# Patient Record
Sex: MALE | Race: WHITE | NOT HISPANIC OR LATINO | Employment: FULL TIME | URBAN - METROPOLITAN AREA
[De-identification: names, ages, dates, MRNs, and addresses within clinical notes are randomized per-mention and may not be internally consistent; named-entity substitution may affect disease eponyms.]

---

## 2018-04-07 ENCOUNTER — APPOINTMENT (OUTPATIENT)
Dept: RADIOLOGY | Facility: IMAGING CENTER | Age: 43
End: 2018-04-07
Attending: NURSE PRACTITIONER
Payer: COMMERCIAL

## 2018-04-07 ENCOUNTER — OFFICE VISIT (OUTPATIENT)
Dept: URGENT CARE | Facility: CLINIC | Age: 43
End: 2018-04-07
Payer: COMMERCIAL

## 2018-04-07 VITALS
SYSTOLIC BLOOD PRESSURE: 134 MMHG | OXYGEN SATURATION: 97 % | HEART RATE: 86 BPM | BODY MASS INDEX: 35 KG/M2 | TEMPERATURE: 97.3 F | WEIGHT: 250 LBS | RESPIRATION RATE: 18 BRPM | HEIGHT: 71 IN | DIASTOLIC BLOOD PRESSURE: 88 MMHG

## 2018-04-07 DIAGNOSIS — M54.41 ACUTE RIGHT-SIDED LOW BACK PAIN WITH RIGHT-SIDED SCIATICA: ICD-10-CM

## 2018-04-07 DIAGNOSIS — S39.012A STRAIN OF MUSCLE, FASCIA AND TENDON OF LOWER BACK, INITIAL ENCOUNTER: ICD-10-CM

## 2018-04-07 DIAGNOSIS — M51.36 DEGENERATIVE DISC DISEASE, LUMBAR: ICD-10-CM

## 2018-04-07 PROCEDURE — 72100 X-RAY EXAM L-S SPINE 2/3 VWS: CPT | Mod: TC,FY | Performed by: NURSE PRACTITIONER

## 2018-04-07 PROCEDURE — 99204 OFFICE O/P NEW MOD 45 MIN: CPT | Performed by: NURSE PRACTITIONER

## 2018-04-07 RX ORDER — CYCLOBENZAPRINE HCL 5 MG
5-10 TABLET ORAL 3 TIMES DAILY PRN
Qty: 30 TAB | Refills: 0 | Status: SHIPPED | OUTPATIENT
Start: 2018-04-07

## 2018-04-07 RX ORDER — PREDNISONE 10 MG/1
40 TABLET ORAL DAILY
Qty: 20 TAB | Refills: 0 | Status: SHIPPED | OUTPATIENT
Start: 2018-04-07 | End: 2018-04-12

## 2018-04-07 ASSESSMENT — ENCOUNTER SYMPTOMS
SORE THROAT: 0
VOMITING: 0
PERIANAL NUMBNESS: 0
SHORTNESS OF BREATH: 0
PARESTHESIAS: 0
PARESIS: 0
CHILLS: 0
FALLS: 0
EYE PAIN: 0
NECK PAIN: 0
FEVER: 0
BACK PAIN: 1
LEG PAIN: 1
NUMBNESS: 0
DIZZINESS: 0
MYALGIAS: 0
NAUSEA: 0
TINGLING: 0
BOWEL INCONTINENCE: 0

## 2018-04-07 ASSESSMENT — PAIN SCALES - GENERAL: PAINLEVEL: 10=SEVERE PAIN

## 2018-04-07 ASSESSMENT — PATIENT HEALTH QUESTIONNAIRE - PHQ9: CLINICAL INTERPRETATION OF PHQ2 SCORE: 0

## 2018-04-07 NOTE — PROGRESS NOTES
Subjective:     Alexandro Gonzalez is a 42 y.o. male who presents for Back Pain (1 week ago injured back doing yard work, monday was prescribed prednisone and now almost done but now going down leg, was not able to move to stand up, spirals down leg into toes. (R) )       Back Pain   This is a new problem. The current episode started in the past 7 days. The problem occurs constantly. The problem is unchanged. The pain is present in the lumbar spine. The quality of the pain is described as shooting. The pain radiates to the right knee and right thigh. The pain is at a severity of 10/10. The pain is severe. The pain is the same all the time. The symptoms are aggravated by bending, position, sitting and twisting. Associated symptoms include leg pain. Pertinent negatives include no bladder incontinence, bowel incontinence, chest pain, fever, numbness, paresis, paresthesias, pelvic pain, perianal numbness or tingling. Treatments tried: medrol dose arun. The treatment provided no relief.   History reviewed. No pertinent past medical history.History reviewed. No pertinent surgical history.  Social History     Social History   • Marital status:      Spouse name: N/A   • Number of children: N/A   • Years of education: N/A     Occupational History   • Not on file.     Social History Main Topics   • Smoking status: Never Smoker   • Smokeless tobacco: Never Used   • Alcohol use No   • Drug use: No   • Sexual activity: Not on file     Other Topics Concern   • Not on file     Social History Narrative   • No narrative on file      Family History   Problem Relation Age of Onset   • Family history unknown: Yes    Review of Systems   Constitutional: Negative for chills and fever.   HENT: Negative for sore throat.    Eyes: Negative for pain.   Respiratory: Negative for shortness of breath.    Cardiovascular: Negative for chest pain.   Gastrointestinal: Negative for bowel incontinence, nausea and vomiting.    "  Genitourinary: Negative for bladder incontinence, hematuria and pelvic pain.   Musculoskeletal: Positive for back pain. Negative for falls, joint pain, myalgias and neck pain.        Right side   Skin: Negative for rash.   Neurological: Negative for dizziness, tingling, numbness and paresthesias.   No Known Allergies   Objective:   /88   Pulse 86   Temp 36.3 °C (97.3 °F)   Resp 18   Ht 1.803 m (5' 11\")   Wt 113.4 kg (250 lb)   SpO2 97%   BMI 34.87 kg/m²   Physical Exam   Constitutional: He is oriented to person, place, and time. He appears well-developed and well-nourished. No distress.   HENT:   Head: Normocephalic and atraumatic.   Eyes: Conjunctivae and EOM are normal. Pupils are equal, round, and reactive to light.   Cardiovascular: Normal rate and regular rhythm.    No murmur heard.  Pulmonary/Chest: Effort normal and breath sounds normal. No respiratory distress.   Abdominal: Soft. He exhibits no distension. There is no tenderness.   Musculoskeletal:        Lumbar back: He exhibits decreased range of motion, tenderness, pain and spasm. He exhibits no swelling.        Back:    Spine- without midline tenderness, step-off or deformity. Without scoliosis or kyphosis. Without noted paraspinous spasm. DROM with lateral bend, and flexion/extension. Without noted tenderness over the sacroiliac notches. Sensation intact bilaterally, BLE motor 5/5 and symmetrical  strength. Positive Straight leg raise.  Gait- antalgic,  without foot drop.      Neurological: He is alert and oriented to person, place, and time. He has normal reflexes. No sensory deficit.   Skin: Skin is warm and dry.   Psychiatric: He has a normal mood and affect.   Vitals reviewed.        Assessment/Plan:   Assessment    1. Acute right-sided low back pain with right-sided sciatica  DX-LUMBAR SPINE-2 OR 3 VIEWS    cyclobenzaprine (FLEXERIL) 5 MG tablet    predniSONE (DELTASONE) 10 MG Tab   2. Strain of muscle, fascia and tendon of lower " back, initial encounter  cyclobenzaprine (FLEXERIL) 5 MG tablet   3. Degenerative disc disease, lumbar       Xray results  1.  There is no acute fracture or subluxation of the lumbar spine.  2.  There is degenerative disc disease with endplate spurring at L1-2, L2-3 and L4-5 levels.    Avoid bending, stooping, heavy or repetitive lifting until symptoms resolve.  Avoid prolonged sitting; frequent changes of position may be helpful.  Begin gentle stretching before activity when tolerated.  Patient is to make a follow-up appointment with PCP on Tuesday in [place].     Patient given precautionary s/sx that mandate immediate follow up and evaluation in the ED. Advised of risks of not doing so.    DDX, Supportive care, and indications for immediate follow-up discussed with patient.    Instructed to return to clinic or nearest emergency department if we are not available for any change in condition, further concerns, or worsening of symptoms.    The patient demonstrated a good understanding and agreed with the treatment plan.;